# Patient Record
Sex: MALE | Race: WHITE | Employment: OTHER | URBAN - METROPOLITAN AREA
[De-identification: names, ages, dates, MRNs, and addresses within clinical notes are randomized per-mention and may not be internally consistent; named-entity substitution may affect disease eponyms.]

---

## 2017-01-18 PROBLEM — E11.9 TYPE 2 DIABETES MELLITUS WITHOUT COMPLICATION, WITHOUT LONG-TERM CURRENT USE OF INSULIN (HCC): Status: ACTIVE | Noted: 2017-01-18

## 2017-01-27 PROBLEM — I27.20 PULMONARY HTN (HCC): Status: ACTIVE | Noted: 2017-01-27

## 2017-01-28 PROCEDURE — 82043 UR ALBUMIN QUANTITATIVE: CPT | Performed by: INTERNAL MEDICINE

## 2017-01-28 PROCEDURE — 82570 ASSAY OF URINE CREATININE: CPT | Performed by: INTERNAL MEDICINE

## 2017-02-01 PROBLEM — M47.816 LUMBAR ARTHROPATHY: Status: ACTIVE | Noted: 2017-02-01

## 2017-02-01 PROBLEM — E11.69 OBESITY, DIABETES, AND HYPERTENSION SYNDROME (HCC): Status: ACTIVE | Noted: 2017-02-01

## 2017-02-01 PROBLEM — E66.9 OBESITY, DIABETES, AND HYPERTENSION SYNDROME (HCC): Status: ACTIVE | Noted: 2017-02-01

## 2017-02-01 PROBLEM — E11.9 TYPE 2 DIABETES MELLITUS WITHOUT COMPLICATION, WITHOUT LONG-TERM CURRENT USE OF INSULIN (HCC): Status: RESOLVED | Noted: 2017-01-18 | Resolved: 2017-02-01

## 2017-02-01 PROBLEM — E11.59 OBESITY, DIABETES, AND HYPERTENSION SYNDROME (HCC): Status: ACTIVE | Noted: 2017-02-01

## 2017-02-01 PROBLEM — I15.2 OBESITY, DIABETES, AND HYPERTENSION SYNDROME (HCC): Status: ACTIVE | Noted: 2017-02-01

## 2017-03-22 PROCEDURE — 80178 ASSAY OF LITHIUM: CPT | Performed by: INTERNAL MEDICINE

## 2017-07-12 PROBLEM — I77.9 LEFT-SIDED CAROTID ARTERY DISEASE (HCC): Status: ACTIVE | Noted: 2017-07-12

## 2017-11-27 PROCEDURE — 80178 ASSAY OF LITHIUM: CPT | Performed by: INTERNAL MEDICINE

## 2017-11-27 PROCEDURE — 36415 COLL VENOUS BLD VENIPUNCTURE: CPT | Performed by: INTERNAL MEDICINE

## 2017-11-28 ENCOUNTER — HOSPITAL ENCOUNTER (EMERGENCY)
Facility: HOSPITAL | Age: 72
Discharge: HOME | End: 2017-11-29
Payer: MEDICARE

## 2017-11-28 ENCOUNTER — APPOINTMENT (OUTPATIENT)
Dept: CT IMAGING | Facility: HOSPITAL | Age: 72
End: 2017-11-28
Payer: MEDICARE

## 2017-11-28 VITALS
DIASTOLIC BLOOD PRESSURE: 63 MMHG | OXYGEN SATURATION: 95 % | HEART RATE: 52 BPM | HEIGHT: 70 IN | BODY MASS INDEX: 31.78 KG/M2 | WEIGHT: 222 LBS | RESPIRATION RATE: 16 BRPM | SYSTOLIC BLOOD PRESSURE: 113 MMHG | TEMPERATURE: 98 F

## 2017-11-28 DIAGNOSIS — R42 VERTIGO: Primary | ICD-10-CM

## 2017-11-28 DIAGNOSIS — N28.9 RENAL INSUFFICIENCY: ICD-10-CM

## 2017-11-28 PROCEDURE — 96360 HYDRATION IV INFUSION INIT: CPT

## 2017-11-28 PROCEDURE — 99285 EMERGENCY DEPT VISIT HI MDM: CPT

## 2017-11-28 PROCEDURE — 84484 ASSAY OF TROPONIN QUANT: CPT

## 2017-11-28 PROCEDURE — 85025 COMPLETE CBC W/AUTO DIFF WBC: CPT

## 2017-11-28 PROCEDURE — 80178 ASSAY OF LITHIUM: CPT

## 2017-11-28 PROCEDURE — 93010 ELECTROCARDIOGRAM REPORT: CPT

## 2017-11-28 PROCEDURE — 82962 GLUCOSE BLOOD TEST: CPT

## 2017-11-28 PROCEDURE — 70450 CT HEAD/BRAIN W/O DYE: CPT

## 2017-11-28 PROCEDURE — 80048 BASIC METABOLIC PNL TOTAL CA: CPT

## 2017-11-28 PROCEDURE — 93005 ELECTROCARDIOGRAM TRACING: CPT

## 2017-11-28 PROCEDURE — 81003 URINALYSIS AUTO W/O SCOPE: CPT

## 2017-11-28 RX ORDER — SODIUM CHLORIDE 9 MG/ML
INJECTION, SOLUTION INTRAVENOUS CONTINUOUS
Status: DISCONTINUED | OUTPATIENT
Start: 2017-11-28 | End: 2017-11-29

## 2017-11-29 RX ORDER — MECLIZINE HYDROCHLORIDE 25 MG/1
25 TABLET ORAL 3 TIMES DAILY PRN
Qty: 30 TABLET | Refills: 0 | Status: SHIPPED | OUTPATIENT
Start: 2017-11-29 | End: 2017-12-12

## 2017-11-29 NOTE — ED PROVIDER NOTES
Patient Seen in: [facility] Emergency Department    History   Patient presents with:  Dizziness (neurologic)    Stated Complaint: dizzy     HPI    Patient is a pleasant 72-year-old with bipolar male with history of type 2 diabetes, presenting to the mellitus ([facility] 2/19/2015   • Controlled type 2 diabetes mellitus with complication (HCC)    • Diabetes ([facility]    • GERD (gastroesophageal reflux disease)    • History of esophageal stricture    • Hyperlipidemia    • Hyperlipidemia associated with type 2 compa Alcohol use: No                Review of Systems    Positive for stated complaint: dizzy   Other systems are as noted in HPI. Constitutional and vital signs reviewed. All other systems reviewed and negative except as noted above.     Physical Exam W/ DIFFERENTIAL - Abnormal; Notable for the following:     Neutrophil Absolute Prelim 10.25 (*)     Neutrophil Absolute 10.25 (*)     Monocyte Absolute 0.94 (*)     All other components within normal limits   TROPONIN I - Normal   LITHIUM (ESKALITH) - Norm radiographic abnormality is seen in the chest.    Ct Brain Or Head (60149)    Result Date: 11/28/2017  PROCEDURE:  CT BRAIN OR HEAD (36742)  COMPARISON:  External Exams, CT BRAIN OR HEAD (95248), 12/02/2015, 11:10.   INDICATIONS:  dizzy  TECHNIQUE:  Noncont time.      MDM       Patient was screened and evaluated during this visit.    As a treating physician attending to the patient, I determined, within reasonable clinical confidence and prior to discharge, that an emergency medical condition was not or was no

## 2017-11-29 NOTE — ED INITIAL ASSESSMENT (HPI)
Pt arrives from home with dizziness. Pt had 1 episode of dizziness at home and was able to hold on to side rale and it got better. Pt did not fall. Denies LOC. No dizziness upon arrival. Denies HA or vision change.  Pt was seen at PCP yesterday and diagnose

## 2017-11-29 NOTE — ED NOTES
Patient being discharged home  Patient demonstrates improvement upon d/c. Pt verbalized understanding of d/c instructions. AVS provided.    Pt home with wife

## 2017-12-18 PROCEDURE — 82043 UR ALBUMIN QUANTITATIVE: CPT | Performed by: INTERNAL MEDICINE

## 2017-12-18 PROCEDURE — 82570 ASSAY OF URINE CREATININE: CPT | Performed by: INTERNAL MEDICINE

## 2018-01-22 PROBLEM — I77.89 AORTIC ROOT ENLARGEMENT (HCC): Status: ACTIVE | Noted: 2018-01-22

## 2018-01-22 PROBLEM — M47.819 SPINAL ARTHRITIS: Status: ACTIVE | Noted: 2018-01-22

## 2018-01-22 PROBLEM — H25.9 AGE-RELATED CATARACT OF BOTH EYES: Status: ACTIVE | Noted: 2018-01-22

## 2018-06-12 PROCEDURE — 83970 ASSAY OF PARATHORMONE: CPT | Performed by: INTERNAL MEDICINE

## 2018-06-20 PROBLEM — Q61.3 PKD (POLYCYSTIC KIDNEY DISEASE): Status: ACTIVE | Noted: 2018-06-20

## 2018-06-20 PROBLEM — E11.29 PROTEINURIA DUE TO TYPE 2 DIABETES MELLITUS (HCC): Status: ACTIVE | Noted: 2018-06-20

## 2018-06-20 PROBLEM — R80.9 PROTEINURIA DUE TO TYPE 2 DIABETES MELLITUS (HCC): Status: ACTIVE | Noted: 2018-06-20

## 2018-08-06 PROCEDURE — 80178 ASSAY OF LITHIUM: CPT | Performed by: INTERNAL MEDICINE

## 2018-10-25 PROCEDURE — 80178 ASSAY OF LITHIUM: CPT | Performed by: INTERNAL MEDICINE

## 2018-11-30 PROCEDURE — 82570 ASSAY OF URINE CREATININE: CPT | Performed by: INTERNAL MEDICINE

## 2018-11-30 PROCEDURE — 80178 ASSAY OF LITHIUM: CPT | Performed by: INTERNAL MEDICINE

## 2018-11-30 PROCEDURE — 82043 UR ALBUMIN QUANTITATIVE: CPT | Performed by: INTERNAL MEDICINE

## 2019-01-07 PROCEDURE — 80178 ASSAY OF LITHIUM: CPT | Performed by: INTERNAL MEDICINE

## 2019-01-11 PROBLEM — I77.9 BILATERAL CAROTID ARTERY DISEASE (HCC): Status: ACTIVE | Noted: 2017-07-12

## 2019-02-18 PROCEDURE — 80178 ASSAY OF LITHIUM: CPT | Performed by: INTERNAL MEDICINE

## 2019-03-16 PROCEDURE — 80178 ASSAY OF LITHIUM: CPT | Performed by: INTERNAL MEDICINE

## 2019-04-20 PROCEDURE — 80178 ASSAY OF LITHIUM: CPT | Performed by: INTERNAL MEDICINE

## 2019-09-06 PROCEDURE — 80178 ASSAY OF LITHIUM: CPT | Performed by: INTERNAL MEDICINE

## 2020-01-14 PROBLEM — IMO0001 ATROPHY, CORTICAL: Status: ACTIVE | Noted: 2020-01-14

## 2020-01-14 PROBLEM — N25.1 NEPHROGENIC DIABETES INSIPIDUS (HCC): Status: ACTIVE | Noted: 2020-01-14

## 2020-01-20 PROBLEM — I49.2 JUNCTIONAL PREMATURE DEPOLARIZATION (HCC): Status: ACTIVE | Noted: 2020-01-20

## 2020-01-20 PROBLEM — N18.30 CKD (CHRONIC KIDNEY DISEASE) STAGE 3, GFR 30-59 ML/MIN (HCC): Status: ACTIVE | Noted: 2020-01-20

## 2020-10-12 PROBLEM — G31.9 BRAIN ATROPHY (HCC): Status: ACTIVE | Noted: 2020-10-12

## 2020-10-22 PROBLEM — I65.23 BILATERAL CAROTID ARTERY STENOSIS: Status: ACTIVE | Noted: 2017-07-12

## 2021-01-08 PROBLEM — C44.92 SQUAMOUS CELL SKIN CANCER: Status: ACTIVE | Noted: 2021-01-08

## 2021-07-27 PROBLEM — K92.1 BLOOD IN STOOL: Status: ACTIVE | Noted: 2021-07-27

## 2021-07-27 PROBLEM — R19.5 POSITIVE FIT (FECAL IMMUNOCHEMICAL TEST): Status: ACTIVE | Noted: 2021-07-27

## 2021-09-17 PROBLEM — R19.5 POSITIVE FIT (FECAL IMMUNOCHEMICAL TEST): Status: RESOLVED | Noted: 2021-07-27 | Resolved: 2021-09-17

## 2021-09-17 PROBLEM — K92.1 BLOOD IN STOOL: Status: RESOLVED | Noted: 2021-07-27 | Resolved: 2021-09-17

## 2021-09-17 PROBLEM — I65.29 ULCERATED ATHEROSCLEROTIC PLAQUE OF CAROTID ARTERY (HCC): Status: ACTIVE | Noted: 2021-09-17

## 2021-09-17 PROBLEM — M47.816 LUMBAR ARTHROPATHY: Status: RESOLVED | Noted: 2017-02-01 | Resolved: 2021-09-17

## 2021-09-17 PROBLEM — I77.2 ULCERATED ATHEROSCLEROTIC PLAQUE OF CAROTID ARTERY (HCC): Status: ACTIVE | Noted: 2021-09-17

## 2022-12-29 ENCOUNTER — HOSPITAL ENCOUNTER (EMERGENCY)
Facility: HOSPITAL | Age: 77
Discharge: LEFT BEFORE DISCHARGE | End: 2022-12-29
Payer: MEDICARE

## 2022-12-29 VITALS
DIASTOLIC BLOOD PRESSURE: 61 MMHG | HEART RATE: 50 BPM | TEMPERATURE: 98 F | OXYGEN SATURATION: 99 % | SYSTOLIC BLOOD PRESSURE: 179 MMHG | RESPIRATION RATE: 19 BRPM

## 2022-12-30 NOTE — ED INITIAL ASSESSMENT (HPI)
A&Ox3 patient bib family for BLE swelling/cellulitis    Patient pmh DM2    Has been having the swelling/pain/ulcer on LLE x6 weeks    Reports worsening pain

## (undated) NOTE — ED AVS SNAPSHOT
Galina Kate   MRN: [id number]    Department:  [facility] Emergency Department   Date of Visit:  11/28/2017           Disclosure     Insurance plans vary and the physician(s) referred by the ER may not be covered by your plan.  Please contact tell this physician (or your personal doctor if your instructions are to return to your personal doctor) about any new or lasting problems. The primary care or specialist physician will see patients referred from the [facility] Emergency Department.  Dinah Martínez